# Patient Record
Sex: MALE | Race: WHITE | NOT HISPANIC OR LATINO | Employment: OTHER | URBAN - NONMETROPOLITAN AREA
[De-identification: names, ages, dates, MRNs, and addresses within clinical notes are randomized per-mention and may not be internally consistent; named-entity substitution may affect disease eponyms.]

---

## 2018-01-22 ENCOUNTER — DOCUMENTATION ONLY (OUTPATIENT)
Dept: FAMILY MEDICINE | Facility: OTHER | Age: 58
End: 2018-01-22

## 2023-02-20 ENCOUNTER — OFFICE VISIT (OUTPATIENT)
Dept: INTERNAL MEDICINE | Facility: OTHER | Age: 63
End: 2023-02-20
Attending: STUDENT IN AN ORGANIZED HEALTH CARE EDUCATION/TRAINING PROGRAM
Payer: COMMERCIAL

## 2023-02-20 VITALS
DIASTOLIC BLOOD PRESSURE: 94 MMHG | HEART RATE: 84 BPM | TEMPERATURE: 98.1 F | WEIGHT: 250.6 LBS | SYSTOLIC BLOOD PRESSURE: 136 MMHG | RESPIRATION RATE: 16 BRPM | OXYGEN SATURATION: 96 %

## 2023-02-20 DIAGNOSIS — J32.9 CHRONIC SINUSITIS, UNSPECIFIED LOCATION: Primary | ICD-10-CM

## 2023-02-20 DIAGNOSIS — L98.9 SKIN LESION: ICD-10-CM

## 2023-02-20 DIAGNOSIS — R03.0 ELEVATED BLOOD PRESSURE READING WITHOUT DIAGNOSIS OF HYPERTENSION: ICD-10-CM

## 2023-02-20 PROCEDURE — 99203 OFFICE O/P NEW LOW 30 MIN: CPT | Performed by: STUDENT IN AN ORGANIZED HEALTH CARE EDUCATION/TRAINING PROGRAM

## 2023-02-20 PROCEDURE — G0463 HOSPITAL OUTPT CLINIC VISIT: HCPCS | Performed by: STUDENT IN AN ORGANIZED HEALTH CARE EDUCATION/TRAINING PROGRAM

## 2023-02-20 RX ORDER — LEVOFLOXACIN 750 MG/1
750 TABLET, FILM COATED ORAL DAILY
Qty: 7 TABLET | Refills: 0 | Status: SHIPPED | OUTPATIENT
Start: 2023-02-20 | End: 2023-06-16

## 2023-02-20 ASSESSMENT — PAIN SCALES - GENERAL: PAINLEVEL: NO PAIN (0)

## 2023-02-20 NOTE — PROGRESS NOTES
Assessment & Plan         ICD-10-CM    1. Chronic sinusitis, unspecified location  J32.9 levofloxacin (LEVAQUIN) 750 MG tablet      2. Elevated blood pressure reading without diagnosis of hypertension  R03.0         Chronic sinusitis: Patient gets nearly annual sinus infections and has had extensive ENT work-up in the past.  Discussed how we should not do prolonged courses of antibiotics due to risk of drug reaction and resistant bacterial infections.  We will treat with a 7-day course of levofloxacin and if symptoms not improve he will return to clinic for evaluation of possible fungal causes of lung and sinus infections as he manages food plots for living.    Elevated blood pressure: Blood pressure improved on recheck to 136/94.  Does not need medication management at this point.    Skin lesion: Erythematous lesion between his 2 nares has present for approximately 1 year.  Does not appear to be a basal cell at this point but possibly could be.  Advised him to stop touching the area for 1 month and if it does not he will return to clinic for reevaluation and possible cryotherapy.  This would be an unusual spot to get a basal cell given limited sun exposure.    Follow-up in 2 to 3 months for annual exam and further discussion of sinusitis and skin lesion      No follow-ups on file.    Ata Zepeda MD  [facility]      Paola Wiseman is a 62 year old accompanied by his spouse, presenting for the following health issues:  Sinus Problem (Sinus pressure and drainage   coughing up stuff    started 3 months)      Sinus Problem     History of Present Illness       Reason for visit:  Sinus infection  Symptom onset:  More than a month  Symptoms include:  Sinus pressure lots of mucus coughingto try and remove it from lungs  Symptom intensity:  Moderate  Symptom progression:  Staying the same  Had these symptoms before:  Yes  Has tried/received treatment for these symptoms:  Yes  Previous treatment  was successful:  Yes  Prior treatment description:  Amox clav  What makes it worse:  Sleeping on my back  What makes it better:  Flushing my sinuses out with wam salt water but that only very temporary    He eats 2-3 servings of fruits and vegetables daily.He consumes 1 sweetened beverage(s) daily.He exercises with enough effort to increase his heart rate 10 to 19 minutes per day.    He is taking medications regularly.       Sinus infection   Sinus pressure and drainage  Started 3 months ago.    In 1991 had first sinus infection.   Would get sinus pressure, mucus and would lose his voice.   Rhaspy voice and would produce some thick mucus from his voicebox.   Looked in throat and no polyps at this time.   Treated with antibiotics and resolved.   Gets Sinus infection every year or so.     This time does not notice a large amount of mucus in throat.   This time has some sputum in chest. First thing in the morning when gets up is worst.   In evening has to cough up green stuff as well.   Is wheezing at night as well.   Inspiratory wheezing.     Had this one time for 4 months and treated with antibiotics.   Has been using e-visit. And not giving him 21 days of antibiotics.     Saw ENT and was told had cancer, sleep apnea and acid reflux.   Did scope in throat and was covered in white sputum.     Inclined head of bed, limited spicy foods. Limited ETOH, coffee etc.    Then got a prescription for amoxicillin and resolved on recheck. Treated with an additional 7 days of antibioitics and resolved.     No sinusitis for 2-3 years. Triggered by sinusitis and cigarette smoke.         Blood pressure and improved on recheck.       Review of Systems         Objective    BP (!) 164/110 (BP Location: Right arm, Patient Position: Sitting, Cuff Size: Adult Regular)   Pulse 84   Temp 98.1  F (36.7  C) (Temporal)   Resp 16   Wt 113.7 kg (250 lb 9.6 oz)   SpO2 96%   There is no height or weight on file to calculate BMI.  Physical Exam    General: Pleasant 62 year old year old male sitting in clinic in no acute distress  HEENT: TM normal, oropharynx with cobblestoning of oropharynx.   Skin: erythematous nonspecific lesion on tip of nose between nare.

## 2023-02-20 NOTE — NURSING NOTE
Chief Complaint   Patient presents with     Sinus Problem     Sinus pressure and drainage   coughing up stuff    started 3 months       Medication reconciliation completed.    FOOD SECURITY SCREENING QUESTIONS:    The next two questions are to help us understand your food security.  If you are feeling you need any assistance in this area, we have resources available to support you today.    Hunger Vital Signs:  Within the past 12 months we worried whether our food would run out before we got money to buy more. Never  Within the past 12 months the food we bought just didn't last and we didn't have money to get more. Never    Initial BP (!) 164/110 (BP Location: Right arm, Patient Position: Sitting, Cuff Size: Adult Regular)   Pulse 84   Temp 98.1  F (36.7  C) (Temporal)   Resp 16   Wt 113.7 kg (250 lb 9.6 oz)   SpO2 96%  There is no height or weight on file to calculate BMI.       Judi Aggarwal LPN .......  2/20/2023  12:50 PM

## 2023-02-28 ENCOUNTER — TELEPHONE (OUTPATIENT)
Dept: INTERNAL MEDICINE | Facility: OTHER | Age: 63
End: 2023-02-28
Payer: COMMERCIAL

## 2023-02-28 NOTE — TELEPHONE ENCOUNTER
Patient called to talk to Dr. Zepeda's nurse. He was given medication for a sinus infection. He is out of pills but still has some symptoms. Please call.    Raine Reilly on 2/28/2023 at 10:03 AM

## 2023-03-01 NOTE — TELEPHONE ENCOUNTER
Attempted to call patient.  No answer.  Left a message to call back.    Judi Aggarwal LPN .......  3/1/2023  3:46 PM

## 2023-04-11 ENCOUNTER — HOSPITAL ENCOUNTER (OUTPATIENT)
Dept: GENERAL RADIOLOGY | Facility: OTHER | Age: 63
Discharge: HOME | End: 2023-04-11
Attending: STUDENT IN AN ORGANIZED HEALTH CARE EDUCATION/TRAINING PROGRAM
Payer: COMMERCIAL

## 2023-04-11 ENCOUNTER — OFFICE VISIT (OUTPATIENT)
Dept: INTERNAL MEDICINE | Facility: OTHER | Age: 63
End: 2023-04-11
Attending: STUDENT IN AN ORGANIZED HEALTH CARE EDUCATION/TRAINING PROGRAM
Payer: COMMERCIAL

## 2023-04-11 VITALS
DIASTOLIC BLOOD PRESSURE: 80 MMHG | RESPIRATION RATE: 16 BRPM | HEART RATE: 112 BPM | SYSTOLIC BLOOD PRESSURE: 138 MMHG | TEMPERATURE: 98.1 F | OXYGEN SATURATION: 95 % | WEIGHT: 250.6 LBS

## 2023-04-11 DIAGNOSIS — R07.89 CHEST WALL DISCOMFORT: ICD-10-CM

## 2023-04-11 DIAGNOSIS — R05.3 CHRONIC COUGH: ICD-10-CM

## 2023-04-11 DIAGNOSIS — R73.9 ELEVATED RANDOM BLOOD GLUCOSE LEVEL: ICD-10-CM

## 2023-04-11 DIAGNOSIS — R05.3 CHRONIC COUGH: Primary | ICD-10-CM

## 2023-04-11 DIAGNOSIS — I44.7 LEFT BUNDLE BRANCH BLOCK: ICD-10-CM

## 2023-04-11 LAB
ALBUMIN SERPL BCG-MCNC: 4.5 G/DL (ref 3.5–5.2)
ALP SERPL-CCNC: 63 U/L (ref 40–129)
ALT SERPL W P-5'-P-CCNC: 40 U/L (ref 10–50)
ANION GAP SERPL CALCULATED.3IONS-SCNC: 11 MMOL/L (ref 7–15)
AST SERPL W P-5'-P-CCNC: 33 U/L (ref 10–50)
ATRIAL RATE - MUSE: 88 BPM
BILIRUB SERPL-MCNC: 0.6 MG/DL
BUN SERPL-MCNC: 12.4 MG/DL (ref 8–23)
CALCIUM SERPL-MCNC: 9.7 MG/DL (ref 8.8–10.2)
CHLORIDE SERPL-SCNC: 102 MMOL/L (ref 98–107)
CREAT SERPL-MCNC: 0.74 MG/DL (ref 0.67–1.17)
DEPRECATED HCO3 PLAS-SCNC: 25 MMOL/L (ref 22–29)
DIASTOLIC BLOOD PRESSURE - MUSE: NORMAL MMHG
ERYTHROCYTE [DISTWIDTH] IN BLOOD BY AUTOMATED COUNT: 13 % (ref 10–15)
GFR SERPL CREATININE-BSD FRML MDRD: >90 ML/MIN/1.73M2
GLUCOSE SERPL-MCNC: 158 MG/DL (ref 70–99)
HBA1C MFR BLD: 6.4 % (ref 4–6.2)
HCT VFR BLD AUTO: 43.5 % (ref 40–53)
HGB BLD-MCNC: 15.3 G/DL (ref 13.3–17.7)
INTERPRETATION ECG - MUSE: NORMAL
MCH RBC QN AUTO: 29.1 PG (ref 26.5–33)
MCHC RBC AUTO-ENTMCNC: 35.2 G/DL (ref 31.5–36.5)
MCV RBC AUTO: 83 FL (ref 78–100)
NT-PROBNP SERPL-MCNC: 82 PG/ML (ref 0–900)
P AXIS - MUSE: 45 DEGREES
PLATELET # BLD AUTO: 175 10E3/UL (ref 150–450)
POTASSIUM SERPL-SCNC: 4.1 MMOL/L (ref 3.4–5.3)
PR INTERVAL - MUSE: 186 MS
PROT SERPL-MCNC: 7.7 G/DL (ref 6.4–8.3)
QRS DURATION - MUSE: 150 MS
QT - MUSE: 418 MS
QTC - MUSE: 505 MS
R AXIS - MUSE: -45 DEGREES
RBC # BLD AUTO: 5.25 10E6/UL (ref 4.4–5.9)
SODIUM SERPL-SCNC: 138 MMOL/L (ref 136–145)
SYSTOLIC BLOOD PRESSURE - MUSE: NORMAL MMHG
T AXIS - MUSE: 107 DEGREES
TROPONIN T SERPL HS-MCNC: 8 NG/L
VENTRICULAR RATE- MUSE: 88 BPM
WBC # BLD AUTO: 4.2 10E3/UL (ref 4–11)

## 2023-04-11 PROCEDURE — 99214 OFFICE O/P EST MOD 30 MIN: CPT | Performed by: STUDENT IN AN ORGANIZED HEALTH CARE EDUCATION/TRAINING PROGRAM

## 2023-04-11 PROCEDURE — 87070 CULTURE OTHR SPECIMN AEROBIC: CPT | Mod: ZL | Performed by: STUDENT IN AN ORGANIZED HEALTH CARE EDUCATION/TRAINING PROGRAM

## 2023-04-11 PROCEDURE — 86612 BLASTOMYCES ANTIBODY: CPT | Mod: ZL | Performed by: STUDENT IN AN ORGANIZED HEALTH CARE EDUCATION/TRAINING PROGRAM

## 2023-04-11 PROCEDURE — 87385 HISTOPLASMA CAPSUL AG IA: CPT | Mod: ZL | Performed by: STUDENT IN AN ORGANIZED HEALTH CARE EDUCATION/TRAINING PROGRAM

## 2023-04-11 PROCEDURE — 80053 COMPREHEN METABOLIC PANEL: CPT | Mod: ZL | Performed by: STUDENT IN AN ORGANIZED HEALTH CARE EDUCATION/TRAINING PROGRAM

## 2023-04-11 PROCEDURE — 71046 X-RAY EXAM CHEST 2 VIEWS: CPT

## 2023-04-11 PROCEDURE — G0463 HOSPITAL OUTPT CLINIC VISIT: HCPCS

## 2023-04-11 PROCEDURE — 83880 ASSAY OF NATRIURETIC PEPTIDE: CPT | Mod: ZL | Performed by: STUDENT IN AN ORGANIZED HEALTH CARE EDUCATION/TRAINING PROGRAM

## 2023-04-11 PROCEDURE — 87205 SMEAR GRAM STAIN: CPT | Mod: ZL | Performed by: STUDENT IN AN ORGANIZED HEALTH CARE EDUCATION/TRAINING PROGRAM

## 2023-04-11 PROCEDURE — 84484 ASSAY OF TROPONIN QUANT: CPT | Mod: ZL | Performed by: STUDENT IN AN ORGANIZED HEALTH CARE EDUCATION/TRAINING PROGRAM

## 2023-04-11 PROCEDURE — 85027 COMPLETE CBC AUTOMATED: CPT | Mod: ZL | Performed by: STUDENT IN AN ORGANIZED HEALTH CARE EDUCATION/TRAINING PROGRAM

## 2023-04-11 PROCEDURE — 93005 ELECTROCARDIOGRAM TRACING: CPT | Performed by: STUDENT IN AN ORGANIZED HEALTH CARE EDUCATION/TRAINING PROGRAM

## 2023-04-11 PROCEDURE — 36415 COLL VENOUS BLD VENIPUNCTURE: CPT | Mod: ZL | Performed by: STUDENT IN AN ORGANIZED HEALTH CARE EDUCATION/TRAINING PROGRAM

## 2023-04-11 PROCEDURE — 83036 HEMOGLOBIN GLYCOSYLATED A1C: CPT | Mod: ZL | Performed by: STUDENT IN AN ORGANIZED HEALTH CARE EDUCATION/TRAINING PROGRAM

## 2023-04-11 PROCEDURE — 93010 ELECTROCARDIOGRAM REPORT: CPT | Performed by: STUDENT IN AN ORGANIZED HEALTH CARE EDUCATION/TRAINING PROGRAM

## 2023-04-11 ASSESSMENT — PAIN SCALES - GENERAL: PAINLEVEL: MILD PAIN (2)

## 2023-04-11 NOTE — LETTER
April 11, 2023      Bowen Aguirre  [address] [facility] [address]        Dear ,    We are writing to inform you of your test results.    Your initial laboratory testing is below    Resulted Orders   Comprehensive Metabolic Panel   Result Value Ref Range    Sodium 138 136 - 145 mmol/L    Potassium 4.1 3.4 - 5.3 mmol/L    Chloride 102 98 - 107 mmol/L    Carbon Dioxide (CO2) 25 22 - 29 mmol/L    Anion Gap 11 7 - 15 mmol/L    Urea Nitrogen 12.4 8.0 - 23.0 mg/dL    Creatinine 0.74 0.67 - 1.17 mg/dL    Calcium 9.7 8.8 - 10.2 mg/dL    Glucose 158 (H) 70 - 99 mg/dL    Alkaline Phosphatase 63 40 - 129 U/L    AST 33 10 - 50 U/L    ALT 40 10 - 50 U/L    Protein Total 7.7 6.4 - 8.3 g/dL    Albumin 4.5 3.5 - 5.2 g/dL    Bilirubin Total 0.6 <=1.2 mg/dL    GFR Estimate >90 >60 mL/min/1.73m2      Comment:      eGFR calculated using 2021 CKD-EPI equation.   Troponin T, High Sensitivity   Result Value Ref Range    Troponin T, High Sensitivity 8 <=22 ng/L      Comment:      Either a High Sensitivity Troponin T baseline (0 hours) value = 100 ng/L, or an increase in High Sensitivity Troponin T = 7 ng/L at 2 hours compared to 0 hours (2-0 hours), suggests myocardial injury, and urgent clinical attention is required.    If the 2-0 hours increase is <7 ng/L, a High Sensitivity Troponin T result above gender-specific reference ranges warrants further evaluation.   Recommendations for further evaluation include correlation with clinical decision-making tool (e.g., HEART), a 3rd High Sensitivity Troponin T test 2 hours after the 2nd (a 20% change from baseline would represent concern), admission for observation, close PCC/cardiology follow-up, or urgent outpatient provocative testing.   Brain Natriuretic Peptide (BNP)   Result Value Ref Range    N Terminal Pro BNP Outpatient 82 0 - 900 pg/mL      Comment:      Reference range shown and results flagged as abnormal are for the outpatient, non acute settings. Establishing a  Problem: Occupational Therapy Goal  Goal: Occupational Therapy Goal  Goals to be met by: 9/1/2017     Patient will increase functional independence with ADLs by performing:    UE Dressing with Set-up Assistance.- met 9/1  LE Dressing with Stand by Assistance. Met 9/1  Grooming while standing with Stand by Assistance. - MET 8/28  Toileting from toilet with Minimal Assistance for hygiene and clothing management. - MET 8/28   Toilet transfer to toilet with Supervision.- Met 9/1  Supine to sit with Stand by assistance. - Met         Outcome: Outcome(s) achieved Date Met: 09/01/17  All goals met     Jacquelyn Chaudhary OT  9/1/2017         baseline value for each individual patient is useful for follow-up.    Suggested inpatient cut points for confirming diagnosis of CHF in an acute setting are:  >450 pg/mL (age 18 to less than 50)  >900 pg/mL (age 50 to less than 75)  >1800 pg/mL (75 yrs and older)    An inpatient or emergency department NT-proPBNP <300 pg/mL effectively rules out acute CHF, with 99% negative predictive value.       CBC W PLT No Diff   Result Value Ref Range    WBC Count 4.2 4.0 - 11.0 10e3/uL    RBC Count 5.25 4.40 - 5.90 10e6/uL    Hemoglobin 15.3 13.3 - 17.7 g/dL    Hematocrit 43.5 40.0 - 53.0 %    MCV 83 78 - 100 fL    MCH 29.1 26.5 - 33.0 pg    MCHC 35.2 31.5 - 36.5 g/dL    RDW 13.0 10.0 - 15.0 %    Platelet Count 175 150 - 450 10e3/uL   Respiratory Aerobic Bacterial Culture   Result Value Ref Range    Gram Stain Result <10 Squamous epithelial cells/low power field (A)     Gram Stain Result <25 PMNs/low power field (A)     Gram Stain Result 2+ Gram positive cocci (A)        If you have any questions or concerns, please call the clinic at the number listed above.       Sincerely,      Ata Zepeda MD
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
## 2023-04-11 NOTE — NURSING NOTE
"Chief Complaint   Patient presents with     Nasal Congestion     \"It is in my lungs\"     follow up   not getting better         Medication reconciliation completed.    FOOD SECURITY SCREENING QUESTIONS:    The next two questions are to help us understand your food security.  If you are feeling you need any assistance in this area, we have resources available to support you today.    Hunger Vital Signs:  Within the past 12 months we worried whether our food would run out before we got money to buy more. Never  Within the past 12 months the food we bought just didn't last and we didn't have money to get more. Never    Initial /80 (BP Location: Right arm, Patient Position: Sitting, Cuff Size: Adult Regular)   Pulse 112   Temp 98.1  F (36.7  C) (Temporal)   Resp 16   Wt 113.7 kg (250 lb 9.6 oz)   SpO2 95%  There is no height or weight on file to calculate BMI.       Judi Aggarwal LPN .......  4/11/2023  10:09 AM  "

## 2023-04-13 LAB
BACTERIA SPT CULT: ABNORMAL
GRAM STAIN RESULT: ABNORMAL

## 2023-04-14 ENCOUNTER — HOSPITAL ENCOUNTER (OUTPATIENT)
Dept: CT IMAGING | Facility: OTHER | Age: 63
Discharge: HOME | End: 2023-04-14
Attending: STUDENT IN AN ORGANIZED HEALTH CARE EDUCATION/TRAINING PROGRAM | Admitting: STUDENT IN AN ORGANIZED HEALTH CARE EDUCATION/TRAINING PROGRAM
Payer: COMMERCIAL

## 2023-04-14 DIAGNOSIS — R05.3 CHRONIC COUGH: ICD-10-CM

## 2023-04-14 LAB — SCANNED LAB RESULT: NORMAL

## 2023-04-14 PROCEDURE — 71260 CT THORAX DX C+: CPT

## 2023-04-14 PROCEDURE — 250N000011 HC RX IP 250 OP 636: Performed by: STUDENT IN AN ORGANIZED HEALTH CARE EDUCATION/TRAINING PROGRAM

## 2023-04-14 RX ORDER — IOPAMIDOL 755 MG/ML
100 INJECTION, SOLUTION INTRAVASCULAR ONCE
Status: COMPLETED | OUTPATIENT
Start: 2023-04-14 | End: 2023-04-14

## 2023-04-14 RX ADMIN — IOPAMIDOL 100 ML: 755 INJECTION, SOLUTION INTRAVENOUS at 16:09

## 2023-04-15 LAB
ASPERGILLUS AB TITR SER CF: NORMAL {TITER}
B DERMAT AB SER-ACNC: 0.5 IV
COCCIDIOIDES AB TITR SER CF: NORMAL {TITER}
H CAPSUL MYC AB TITR SER CF: NORMAL {TITER}
H CAPSUL YST AB TITR SER CF: NORMAL {TITER}

## 2023-04-17 DIAGNOSIS — R91.8 PULMONARY NODULES: Primary | ICD-10-CM

## 2023-04-20 ENCOUNTER — TELEPHONE (OUTPATIENT)
Dept: INTERNAL MEDICINE | Facility: OTHER | Age: 63
End: 2023-04-20
Payer: COMMERCIAL

## 2023-04-20 ENCOUNTER — TRANSFERRED RECORDS (OUTPATIENT)
Dept: HEALTH INFORMATION MANAGEMENT | Facility: OTHER | Age: 63
End: 2023-04-20
Payer: COMMERCIAL

## 2023-04-20 NOTE — TELEPHONE ENCOUNTER
Please call the patient.  He wants to talk with PCP bout an appointment in [place].  That is all he would say.      Tiffany Najera on 4/20/2023 at 11:07 AM

## 2023-04-21 NOTE — TELEPHONE ENCOUNTER
After verifying patient's name and date of birth, patient stated [place] did call him and it was all taken care of.    Judi Aggarwal LPN....4/21/2023 10:05 AM

## 2023-06-07 ENCOUNTER — NURSE TRIAGE (OUTPATIENT)
Dept: INTERNAL MEDICINE | Facility: OTHER | Age: 63
End: 2023-06-07
Payer: COMMERCIAL

## 2023-06-07 NOTE — TELEPHONE ENCOUNTER
Reason for call: Patient wanting a work in appointment.    Is the appointment for a Hospital Follow up?  No     (If yes - Unable to find an appointment with any provider during the time frame needed. Nurse/Provider - Can this patient be worked into a schedule with PCP or team member?)    Patient is having the following symptoms: Ongoing SOB     The patient is requesting an appointment with  [facility] - telephone visit    Was an appointment offered for this call? No    If Yes, what is the date of the appointment?  Pt has an appt on 6/16    Preferred method for responding to this message: Telephone Call    Phone number patient can be reached at? Cell number on file:    Telephone Information:   Mobile [phone]       If we can't reach you directly, may we leave a detailed response at the number you provided? Yes    Can this message wait until your PCP/provider returns if unavailable today? No

## 2023-06-07 NOTE — TELEPHONE ENCOUNTER
Patient has an appointment with ÁNGEL Lee NP 6/16/23 and will keep that appointment. Unable to work him in any sooner with an IM MD as discussed. Kandi Vu RN on 6/7/2023 at 4:21 PM      Reason for Disposition    MILD longstanding difficulty breathing (e.g., speaks in phrases, SOB even at rest, pulse 100-120) and SAME as normal    Answer Assessment - Initial Assessment Questions  Back in December he was diagnosed with a bacterial lung infection along with pleural effusion. He improved with treatment, but the symptoms returned. Imaging was done and PCP discussed potentially retreating with antibiotics. Patient declined at the time and was referred to [facility] for node biopsy/bronchoscopy. This was apparently negative for concerns. They tested for fungal lung infection twice - both times results were negative. He continues to cough up yellow sputum. He has had chronic annual sinus infections, and each time they require antibiotics. The last occurrence lasted 4 months and required re-treatment.    Protocols used: BREATHING DIFFICULTY-A-OH

## 2023-06-07 NOTE — TELEPHONE ENCOUNTER
Please call and triage. [facility] is out of the office until Monday    Vi Best LPN on 6/7/2023 at 3:22 PM

## 2023-06-16 ENCOUNTER — OFFICE VISIT (OUTPATIENT)
Dept: INTERNAL MEDICINE | Facility: OTHER | Age: 63
End: 2023-06-16
Attending: NURSE PRACTITIONER
Payer: COMMERCIAL

## 2023-06-16 VITALS
TEMPERATURE: 97.6 F | HEART RATE: 78 BPM | DIASTOLIC BLOOD PRESSURE: 80 MMHG | HEIGHT: 72 IN | WEIGHT: 242 LBS | BODY MASS INDEX: 32.78 KG/M2 | RESPIRATION RATE: 16 BRPM | SYSTOLIC BLOOD PRESSURE: 132 MMHG | OXYGEN SATURATION: 97 %

## 2023-06-16 DIAGNOSIS — J32.9 CHRONIC SINUSITIS, UNSPECIFIED LOCATION: ICD-10-CM

## 2023-06-16 DIAGNOSIS — R05.3 CHRONIC COUGH: Primary | ICD-10-CM

## 2023-06-16 PROCEDURE — 99214 OFFICE O/P EST MOD 30 MIN: CPT | Performed by: NURSE PRACTITIONER

## 2023-06-16 PROCEDURE — G0463 HOSPITAL OUTPT CLINIC VISIT: HCPCS

## 2023-06-16 RX ORDER — ALBUTEROL SULFATE 90 UG/1
2 AEROSOL, METERED RESPIRATORY (INHALATION)
COMMUNITY
Start: 2023-03-01

## 2023-06-16 RX ORDER — AMOXICILLIN AND CLAVULANATE POTASSIUM 500; 125 MG/1; MG/1
TABLET, FILM COATED ORAL
COMMUNITY
Start: 2022-07-22 | End: 2023-06-16

## 2023-06-16 RX ORDER — ALBUTEROL SULFATE 90 UG/1
AEROSOL, METERED RESPIRATORY (INHALATION)
COMMUNITY
Start: 2023-03-01

## 2023-06-16 ASSESSMENT — ENCOUNTER SYMPTOMS
SHORTNESS OF BREATH: 1
COUGH: 1

## 2023-06-16 ASSESSMENT — PAIN SCALES - GENERAL: PAINLEVEL: NO PAIN (0)

## 2023-06-16 NOTE — PROGRESS NOTES
"  Assessment & Plan     Chronic cough  Discussed that we can do a trial of Augmentin twice daily for 21 days per patient's very strong request.  If he does not improve after this course of treatment I would recommend he go back to see pulmonology.  We discussed the risks that come with long-term antibiotic treatment to include C. difficile, [place] and potential and he is aware of this and is willing to proceed.  - amoxicillin-clavulanate (AUGMENTIN) 875-125 MG tablet  Dispense: 42 tablet; Refill: 0    Chronic sinusitis, unspecified location  As above.  - amoxicillin-clavulanate (AUGMENTIN) 875-125 MG tablet  Dispense: 42 tablet; Refill: 0      Independent interpretation of a test performed by another physician/other qualified health care professional (not separately reported) - notes in EPIC  Prescription drug management     BMI:   Estimated body mass index is 32.82 kg/m  as calculated from the following:    Height as of this encounter: 1.829 m (6').    Weight as of this encounter: 109.8 kg (242 lb).     Return if symptoms worsen or fail to improve.    Nelia Lee NP  [facility]    Paola Wiseman is a 63 year old, presenting for the following health issues: Shortness of Breath (7 months)    Patient presents for SOB, respiratory congestion/infection that have been ongoing for about 7 months.  Has seen numerous specialists and antibiotic therapy has been \"too short lived for any total relief of symptoms\".        6/16/2023    10:28 AM   Additional Questions   Roomed by Gabrielle HERNANDEZ LPN     Shortness of Breath  This is a chronic problem. The current episode started more than 1 month ago. The problem occurs daily. The problem has been unchanged. Associated symptoms include congestion and coughing.   History of Present Illness       Reason for visit:  Sinus issues    He eats 2-3 servings of fruits and vegetables daily.He consumes 1 sweetened beverage(s) daily.He exercises with enough " effort to increase his heart rate 60 or more minutes per day.  He exercises with enough effort to increase his heart rate 7 days per week.   He is taking medications regularly.    4/26/23:  1. 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 He certainly has many environmental exposures. I told him I would send him to talk with infectious disease if for some reason they do not think this is a true infection would then need to discuss whether or not treatment should be initiated for possible sarcoidosis but I think given the amount of secretions and inflammatory nature it would fit in my mind with histoplasmosis.  -Infectious disease consultation in the near future  Follow-Up: With ID  Demian Mccann DO  Pulmonary/Critical Care    4/24/23: Had bronchoscopy completed on 4/24/23 by Dr. Mccann - [facility]  Per ID notes: Discussed results with patient, overall his blasto and histo serologies thus far have returned negative but were still waiting on his serum histo antigen to return. Discussed treatment, pt would like to hold off at this time to avoid any toxic ADEs and since he feels he is improving. We also discussed some of his path results could be consistent with natural healing process of prior infection. Continue to follow culture results, noted one colony of penicillium, likely c/w contamination or incidental finding.  If his symptoms worsen he will reach out to us for further evaluation and treatment. Recommend fu CT in 2m or so to fu on LAD with myself or Dr. Mccann.  Guido Bautista DO  Division of Infectious Diseases     4/14/23:  Chest CT FINDINGS:  There is diffuse thoracic adenopathy. A dominant subcarinal mass measures 3.1 x 5.5 cm. A dominant left axillary node  measures 2.9 cm. The spleen is enlarged, measuring 16 cm in length. Innumerable pulmonary nodules are present, with a central peribronchovascular prominence. The largest nodule measures 13 mm in the subpleural left lower lobe. The heart is upper normal in size. No pericardial or pleural effusion is seen. No suspicious osseous lesions are seen.                                                   IMPRESSION:  Diffuse bulky thoracic adenopathy. Splenomegaly. Innumerable pulmonary nodules. Differential considerations favor sarcoidosis. Lymphoma or metastatic disease are not excluded. Recommend pulmonology consultation for possible bronchoscopy. Axillary lorne biopsy is also likely feasible.  HENOK NEWMAN MD     Review of Systems   HENT: Positive for congestion.    Respiratory: Positive for cough and shortness of breath.           Objective    /80 (BP Location: Right arm, Patient Position: Sitting, Cuff Size: Adult Regular)   Pulse 78   Temp 97.6  F (36.4  C) (Temporal)   Resp 16   Ht 1.829 m (6')   Wt 109.8 kg (242 lb)   SpO2 97%   BMI 32.82 kg/m    Body mass index is 32.82 kg/m .  Physical Exam   GENERAL: healthy, alert and no distress  EYES: Eyes grossly normal to inspection, PERRL and conjunctivae and sclerae normal  RESP: no rales , no rhonchi, expiratory wheezes throughout and inspiratory wheezes throughout  CV: regular rates and rhythm  ABDOMEN: soft, nontender and bowel sounds normal  MS: no gross musculoskeletal defects noted, no edema  NEURO: Normal strength and tone, mentation intact and speech normal  PSYCH: mentation appears normal, affect normal/bright    No results found for any visits on 06/16/23.

## 2023-06-16 NOTE — NURSING NOTE
Chief Complaint   Patient presents with     Shortness of Breath     7 months       Initial /80 (BP Location: Right arm, Patient Position: Sitting, Cuff Size: Adult Regular)   Pulse 78   Temp 97.6  F (36.4  C) (Temporal)   Resp 16   Ht 1.829 m (6')   Wt 109.8 kg (242 lb)   SpO2 97%   BMI 32.82 kg/m   Estimated body mass index is 32.82 kg/m  as calculated from the following:    Height as of this encounter: 1.829 m (6').    Weight as of this encounter: 109.8 kg (242 lb).     Medication Reconciliation: complete      FOOD SECURITY SCREENING QUESTIONS:    The next two questions are to help us understand your food security.  If you are feeling you need any assistance in this area, we have resources available to support you today.    Hunger Vital Signs:  Within the past 12 months we worried whether our food would run out before we got money to buy more. Never  Within the past 12 months the food we bought just didn't last and we didn't have money to get more. Never      Advance care plan reviewed      Gabrielle Villavicencio LPN on 6/16/2023 at 10:38 AM

## 2023-09-22 ENCOUNTER — OFFICE VISIT (OUTPATIENT)
Dept: INTERNAL MEDICINE | Facility: OTHER | Age: 63
End: 2023-09-22
Attending: STUDENT IN AN ORGANIZED HEALTH CARE EDUCATION/TRAINING PROGRAM
Payer: COMMERCIAL

## 2023-09-22 ENCOUNTER — LAB (OUTPATIENT)
Dept: INTERNAL MEDICINE | Facility: OTHER | Age: 63
End: 2023-09-22

## 2023-09-22 VITALS
WEIGHT: 240.2 LBS | BODY MASS INDEX: 34.39 KG/M2 | TEMPERATURE: 98 F | DIASTOLIC BLOOD PRESSURE: 82 MMHG | HEART RATE: 73 BPM | SYSTOLIC BLOOD PRESSURE: 138 MMHG | OXYGEN SATURATION: 96 % | HEIGHT: 70 IN | RESPIRATION RATE: 18 BRPM

## 2023-09-22 DIAGNOSIS — Z12.11 ENCOUNTER FOR SCREENING COLONOSCOPY: ICD-10-CM

## 2023-09-22 DIAGNOSIS — R07.9 CHEST PAIN, UNSPECIFIED TYPE: ICD-10-CM

## 2023-09-22 DIAGNOSIS — J32.9 CHRONIC SINUSITIS, UNSPECIFIED LOCATION: ICD-10-CM

## 2023-09-22 DIAGNOSIS — R91.8 PULMONARY NODULES: Primary | ICD-10-CM

## 2023-09-22 DIAGNOSIS — Z11.4 SCREENING FOR HUMAN IMMUNODEFICIENCY VIRUS: ICD-10-CM

## 2023-09-22 DIAGNOSIS — I44.7 LEFT BUNDLE BRANCH BLOCK: ICD-10-CM

## 2023-09-22 LAB
ALBUMIN SERPL BCG-MCNC: 4.6 G/DL (ref 3.5–5.2)
ALP SERPL-CCNC: 64 U/L (ref 40–129)
ALT SERPL W P-5'-P-CCNC: 25 U/L (ref 0–70)
ANION GAP SERPL CALCULATED.3IONS-SCNC: 14 MMOL/L (ref 7–15)
AST SERPL W P-5'-P-CCNC: 26 U/L (ref 0–45)
BASOPHILS # BLD AUTO: 0 10E3/UL (ref 0–0.2)
BASOPHILS NFR BLD AUTO: 1 %
BILIRUB SERPL-MCNC: 0.5 MG/DL
BUN SERPL-MCNC: 13.7 MG/DL (ref 8–23)
CALCIUM SERPL-MCNC: 9.7 MG/DL (ref 8.8–10.2)
CHLORIDE SERPL-SCNC: 103 MMOL/L (ref 98–107)
CREAT SERPL-MCNC: 0.75 MG/DL (ref 0.67–1.17)
DEPRECATED HCO3 PLAS-SCNC: 21 MMOL/L (ref 22–29)
EGFRCR SERPLBLD CKD-EPI 2021: >90 ML/MIN/1.73M2
EOSINOPHIL # BLD AUTO: 0.2 10E3/UL (ref 0–0.7)
EOSINOPHIL NFR BLD AUTO: 5 %
ERYTHROCYTE [DISTWIDTH] IN BLOOD BY AUTOMATED COUNT: 13 % (ref 10–15)
GLUCOSE SERPL-MCNC: 115 MG/DL (ref 70–99)
HCT VFR BLD AUTO: 43.6 % (ref 40–53)
HGB BLD-MCNC: 14.7 G/DL (ref 13.3–17.7)
IMM GRANULOCYTES # BLD: 0 10E3/UL
IMM GRANULOCYTES NFR BLD: 0 %
LYMPHOCYTES # BLD AUTO: 0.6 10E3/UL (ref 0.8–5.3)
LYMPHOCYTES NFR BLD AUTO: 17 %
MCH RBC QN AUTO: 28.5 PG (ref 26.5–33)
MCHC RBC AUTO-ENTMCNC: 33.7 G/DL (ref 31.5–36.5)
MCV RBC AUTO: 85 FL (ref 78–100)
MONOCYTES # BLD AUTO: 0.5 10E3/UL (ref 0–1.3)
MONOCYTES NFR BLD AUTO: 13 %
NEUTROPHILS # BLD AUTO: 2.3 10E3/UL (ref 1.6–8.3)
NEUTROPHILS NFR BLD AUTO: 64 %
NRBC # BLD AUTO: 0 10E3/UL
NRBC BLD AUTO-RTO: 0 /100
PLATELET # BLD AUTO: 167 10E3/UL (ref 150–450)
POTASSIUM SERPL-SCNC: 4.3 MMOL/L (ref 3.4–5.3)
PROT SERPL-MCNC: 7.7 G/DL (ref 6.4–8.3)
RBC # BLD AUTO: 5.16 10E6/UL (ref 4.4–5.9)
SODIUM SERPL-SCNC: 138 MMOL/L (ref 136–145)
TROPONIN T SERPL HS-MCNC: 7 NG/L
WBC # BLD AUTO: 3.6 10E3/UL (ref 4–11)

## 2023-09-22 PROCEDURE — 82164 ANGIOTENSIN I ENZYME TEST: CPT | Mod: ZL | Performed by: STUDENT IN AN ORGANIZED HEALTH CARE EDUCATION/TRAINING PROGRAM

## 2023-09-22 PROCEDURE — 87389 HIV-1 AG W/HIV-1&-2 AB AG IA: CPT | Mod: ZL | Performed by: STUDENT IN AN ORGANIZED HEALTH CARE EDUCATION/TRAINING PROGRAM

## 2023-09-22 PROCEDURE — 85025 COMPLETE CBC W/AUTO DIFF WBC: CPT | Mod: ZL | Performed by: STUDENT IN AN ORGANIZED HEALTH CARE EDUCATION/TRAINING PROGRAM

## 2023-09-22 PROCEDURE — 86037 ANCA TITER EACH ANTIBODY: CPT | Mod: ZL | Performed by: STUDENT IN AN ORGANIZED HEALTH CARE EDUCATION/TRAINING PROGRAM

## 2023-09-22 PROCEDURE — G0463 HOSPITAL OUTPT CLINIC VISIT: HCPCS

## 2023-09-22 PROCEDURE — 93005 ELECTROCARDIOGRAM TRACING: CPT | Performed by: STUDENT IN AN ORGANIZED HEALTH CARE EDUCATION/TRAINING PROGRAM

## 2023-09-22 PROCEDURE — 84484 ASSAY OF TROPONIN QUANT: CPT | Mod: ZL | Performed by: STUDENT IN AN ORGANIZED HEALTH CARE EDUCATION/TRAINING PROGRAM

## 2023-09-22 PROCEDURE — 99215 OFFICE O/P EST HI 40 MIN: CPT | Performed by: STUDENT IN AN ORGANIZED HEALTH CARE EDUCATION/TRAINING PROGRAM

## 2023-09-22 PROCEDURE — 36415 COLL VENOUS BLD VENIPUNCTURE: CPT | Mod: ZL | Performed by: STUDENT IN AN ORGANIZED HEALTH CARE EDUCATION/TRAINING PROGRAM

## 2023-09-22 PROCEDURE — 80053 COMPREHEN METABOLIC PANEL: CPT | Mod: ZL | Performed by: STUDENT IN AN ORGANIZED HEALTH CARE EDUCATION/TRAINING PROGRAM

## 2023-09-22 PROCEDURE — 93010 ELECTROCARDIOGRAM REPORT: CPT | Performed by: STUDENT IN AN ORGANIZED HEALTH CARE EDUCATION/TRAINING PROGRAM

## 2023-09-22 ASSESSMENT — PAIN SCALES - GENERAL: PAINLEVEL: NO PAIN (0)

## 2023-09-22 NOTE — NURSING NOTE
"Chief Complaint   Patient presents with    Follow Up     Regarding chronic cough       Medication reconciliation completed.    FOOD SECURITY SCREENING QUESTIONS:    The next two questions are to help us understand your food security.  If you are feeling you need any assistance in this area, we have resources available to support you today.    Hunger Vital Signs:  Within the past 12 months we worried whether our food would run out before we got money to buy more. Never  Within the past 12 months the food we bought just didn't last and we didn't have money to get more. Never    Initial /82 (BP Location: Right arm, Patient Position: Sitting, Cuff Size: Adult Large)   Pulse 73   Temp 98  F (36.7  C) (Temporal)   Resp 18   Ht 1.778 m (5' 10\")   Wt 109 kg (240 lb 3.2 oz)   SpO2 96%   BMI 34.47 kg/m   Estimated body mass index is 34.47 kg/m  as calculated from the following:    Height as of this encounter: 1.778 m (5' 10\").    Weight as of this encounter: 109 kg (240 lb 3.2 oz).       Judi Aggarwal LPN .......  9/22/2023  10:23 AM    "

## 2023-09-22 NOTE — LETTER
September 25, 2023      Bowen Aguirre  [address] [facility] [address]        Dear ,    We are writing to inform you of your test results.    Your laboratory results are below.  Your heart testing was normal.  Your blood counts are normal as well.  You do have a markedly elevated angiotensin-converting enzyme level which is consistent with a diagnosis of sarcoidosis.  This is very good news that is not a fungal infection and can explain the spots in your lungs.  I do want you to get the CT scan as we discussed in clinic and please make an appointment for follow-up with the pulmonologist in [place].  Please let me know if you have any questions about this.  I think we also need to follow-up your chest pain with the stress test as we discussed.  This is likely unrelated to your sarcoidosis.    Resulted Orders   Troponin T, High Sensitivity   Result Value Ref Range    Troponin T, High Sensitivity 7 <=22 ng/L      Comment:      Either a High Sensitivity Troponin T baseline (0 hours) value = 100 ng/L, or an increase in High Sensitivity Troponin T = 7 ng/L at 2 hours compared to 0 hours (2-0 hours), suggests myocardial injury, and urgent clinical attention is required.    If the 2-0 hours increase is <7 ng/L, a High Sensitivity Troponin T result above gender-specific reference ranges warrants further evaluation.   Recommendations for further evaluation include correlation with clinical decision-making tool (e.g., HEART), a 3rd High Sensitivity Troponin T test 2 hours after the 2nd (a 20% change from baseline would represent concern), admission for observation, close PCC/cardiology follow-up, or urgent outpatient provocative testing.   Angiotensin converting enzyme   Result Value Ref Range    Angiotensin Converting Enzyme 172 (H) 16 - 85 U/L      Comment:      Performed By: [facility]  [address] [place] [id number]  : Slim Walker MD, PhD  CLIA Number:  [id number]   Comprehensive Metabolic Panel   Result Value Ref Range    Sodium 138 136 - 145 mmol/L    Potassium 4.3 3.4 - 5.3 mmol/L    Chloride 103 98 - 107 mmol/L    Carbon Dioxide (CO2) 21 (L) 22 - 29 mmol/L    Anion Gap 14 7 - 15 mmol/L    Urea Nitrogen 13.7 8.0 - 23.0 mg/dL    Creatinine 0.75 0.67 - 1.17 mg/dL    Calcium 9.7 8.8 - 10.2 mg/dL    Glucose 115 (H) 70 - 99 mg/dL    Alkaline Phosphatase 64 40 - 129 U/L    AST 26 0 - 45 U/L      Comment:      Reference intervals for this test were updated on 6/12/2023 to more accurately reflect our healthy population. There may be differences in the flagging of prior results with similar values performed with this method. Interpretation of those prior results can be made in the context of the updated reference intervals.    ALT 25 0 - 70 U/L      Comment:      Reference intervals for this test were updated on 6/12/2023 to more accurately reflect our healthy population. There may be differences in the flagging of prior results with similar values performed with this method. Interpretation of those prior results can be made in the context of the updated reference intervals.      Protein Total 7.7 6.4 - 8.3 g/dL    Albumin 4.6 3.5 - 5.2 g/dL    Bilirubin Total 0.5 <=1.2 mg/dL    GFR Estimate >90 >60 mL/min/1.73m2   HIV Antigen Antibody Combo   Result Value Ref Range    HIV Antigen Antibody Combo Nonreactive Nonreactive      Comment:      HIV-1 p24 Ag & HIV-1/HIV-2 Ab Not Detected   CBC with platelets and differential   Result Value Ref Range    WBC Count 3.6 (L) 4.0 - 11.0 10e3/uL    RBC Count 5.16 4.40 - 5.90 10e6/uL    Hemoglobin 14.7 13.3 - 17.7 g/dL    Hematocrit 43.6 40.0 - 53.0 %    MCV 85 78 - 100 fL    MCH 28.5 26.5 - 33.0 pg    MCHC 33.7 31.5 - 36.5 g/dL    RDW 13.0 10.0 - 15.0 %    Platelet Count 167 150 - 450 10e3/uL    % Neutrophils 64 %    % Lymphocytes 17 %    % Monocytes 13 %    % Eosinophils 5 %    % Basophils 1 %    % Immature Granulocytes 0 %    NRBCs  per 100 WBC 0 <1 /100    Absolute Neutrophils 2.3 1.6 - 8.3 10e3/uL    Absolute Lymphocytes 0.6 (L) 0.8 - 5.3 10e3/uL    Absolute Monocytes 0.5 0.0 - 1.3 10e3/uL    Absolute Eosinophils 0.2 0.0 - 0.7 10e3/uL    Absolute Basophils 0.0 0.0 - 0.2 10e3/uL    Absolute Immature Granulocytes 0.0 <=0.4 10e3/uL    Absolute NRBCs 0.0 10e3/uL       If you have any questions or concerns, please call the clinic at the number listed above.       Sincerely,      Ata Zepeda MD

## 2023-09-22 NOTE — PROGRESS NOTES
Assessment & Plan         ICD-10-CM    1. Pulmonary nodules  R91.8 Angiotensin converting enzyme     ANCA IgG by IFA with Reflex to Titer     CBC with Platelets & Differential     Comprehensive Metabolic Panel     Angiotensin converting enzyme     ANCA IgG by IFA with Reflex to Titer     CBC with Platelets & Differential     Comprehensive Metabolic Panel     CT Chest w Contrast      2. Chest pain, unspecified type  R07.9 Troponin T, High Sensitivity     CBC with Platelets & Differential     EKG 12-lead complete w/read - Clinics     Troponin T, High Sensitivity     CBC with Platelets & Differential     NM Lexiscan stress test      3. Left bundle branch block  I44.7 NM Lexiscan stress test      4. Chronic sinusitis, unspecified location  J32.9 amoxicillin-clavulanate (AUGMENTIN) 875-125 MG tablet     ANCA IgG by IFA with Reflex to Titer     ANCA IgG by IFA with Reflex to Titer      5. Screening for human immunodeficiency virus  Z11.4 HIV Antigen Antibody Combo     HIV Antigen Antibody Combo      6. Encounter for screening colonoscopy  Z12.11 DRE([employer])        Pulmonary nodules: Multiple pulmonary nodules initially concerning for histoplasmosis.  Bronchoscopy performed and I am unable to visualize any results.  He reports that he did not test positive for histoplasma.  Fungal antigen testing was negative from me.  I did give him a 14-day course of Augmentin discussed the risks of further antibiotics.  He was given a refill so he does not have to come back in the future.  I do not think that his symptoms and pulmonary nodules are related to a bacterial sinusitis as he is concerned they are.  We will repeat a CT scan that was requested by pulmonary medicine back in July which the patient did not schedule.  If there is a accessible lesion or worsening of his nodules we will attempt biopsy versus referral to pulmonology.  There is concern that this could be sarcoid but certainly the possibility of an  undiagnosed or latent fungal infection is quite possible particularly given his soil exposure in an endemic area of blastomycosis.  We will obtain ANCA panel and ACE level to further evaluate.    Left bundle branch block, chest pain: New bundle branch block which has converted to a incomplete left bundle branch block today.  Troponin negative we will perform Lexiscan.    Declines colonoscopy was willing to undergo Cologuard.  This is ordered for him today.    61 minutes spent by me on the date of the encounter doing chart review, history and exam, documentation and further activities per the note    Follow-up with me in 3 months for annual physical exam, sooner if needed.  We will send him results from all of the above testing when he returns.    No follow-ups on file.    Ata Zepeda MD  [facility] AND [facility]      Paola Wiseman is a 63 year old, presenting for the following health issues:  Follow Up (Regarding chronic cough)      History of Present Illness       Reason for visit:  Follow up chronic cough    He eats 2-3 servings of fruits and vegetables daily.He consumes 0 sweetened beverage(s) daily.He exercises with enough effort to increase his heart rate 60 or more minutes per day.  He exercises with enough effort to increase his heart rate 7 days per week.   He is taking medications regularly.         Follow up regarding chronic cough    Swears that he needs at least 4 weeks of antibiotics to treat sinusitis he is coughing things up now.  He states they did nothing for him in [place].  I did bring up that he had a bronchoscopy.  Cultures were apparently negative for histoplasma.  He does not know that he was diagnosed with anything else.  He did not follow-up with pulmonology or infectious disease.  No fever or chills.  No chest pain.  Still working with soil via food plots.        Also having chest pain.   The other day.   Left bundle branch block on EKG from this spring.   Felt lack of energy  "  Did not feel right  Symptoms have subsequently resolved.  Denied any radiation of chest pain into the jaw shoulder etc.  No chest pressure.  He was frustrated with a client at that time and was fairly angry.  Symptoms have subsequently resolved          Review of Systems         Objective    /82 (BP Location: Right arm, Patient Position: Sitting, Cuff Size: Adult Large)   Pulse 73   Temp 98  F (36.7  C) (Temporal)   Resp 18   Ht 1.778 m (5' 10\")   Wt 109 kg (240 lb 3.2 oz)   SpO2 96%   BMI 34.47 kg/m    Body mass index is 34.47 kg/m .  Physical Exam   General: Pleasant 63-year-old man sitting in clinic compromise wife no acute distress  Pulmonary: Clear to auscultation  CV: Regular rate and rhythm    Reviewed pulmonology, infectious disease notes, most recent laboratory results.  Discussed optimal testing with radiology.                  "

## 2023-09-23 LAB — HIV 1+2 AB+HIV1 P24 AG SERPL QL IA: NONREACTIVE

## 2023-09-24 LAB — ACE SERPL-CCNC: 172 U/L

## 2023-09-25 LAB
ATRIAL RATE - MUSE: 71 BPM
DIASTOLIC BLOOD PRESSURE - MUSE: NORMAL MMHG
INTERPRETATION ECG - MUSE: NORMAL
P AXIS - MUSE: 39 DEGREES
PR INTERVAL - MUSE: 190 MS
QRS DURATION - MUSE: 104 MS
QT - MUSE: 404 MS
QTC - MUSE: 439 MS
R AXIS - MUSE: -35 DEGREES
SYSTOLIC BLOOD PRESSURE - MUSE: NORMAL MMHG
T AXIS - MUSE: 44 DEGREES
VENTRICULAR RATE- MUSE: 71 BPM

## 2023-09-26 LAB
ANCA AB PATTERN SER IF-IMP: ABNORMAL
C-ANCA TITR SER IF: ABNORMAL {TITER}

## 2023-09-29 ENCOUNTER — HOSPITAL ENCOUNTER (OUTPATIENT)
Dept: CT IMAGING | Facility: OTHER | Age: 63
Discharge: HOME | End: 2023-09-29
Attending: STUDENT IN AN ORGANIZED HEALTH CARE EDUCATION/TRAINING PROGRAM | Admitting: STUDENT IN AN ORGANIZED HEALTH CARE EDUCATION/TRAINING PROGRAM
Payer: COMMERCIAL

## 2023-09-29 DIAGNOSIS — R91.8 PULMONARY NODULES: ICD-10-CM

## 2023-09-29 PROCEDURE — 71260 CT THORAX DX C+: CPT

## 2023-09-29 PROCEDURE — 250N000011 HC RX IP 250 OP 636: Performed by: STUDENT IN AN ORGANIZED HEALTH CARE EDUCATION/TRAINING PROGRAM

## 2023-09-29 RX ORDER — IOPAMIDOL 755 MG/ML
90 INJECTION, SOLUTION INTRAVASCULAR ONCE
Status: COMPLETED | OUTPATIENT
Start: 2023-09-29 | End: 2023-09-29

## 2023-09-29 RX ADMIN — IOPAMIDOL 90 ML: 755 INJECTION, SOLUTION INTRAVENOUS at 10:31

## 2023-09-29 NOTE — LETTER
September 29, 2023      Bowen Aguirre  [address] [facility] [address]        Dear ,    We are writing to inform you of your test results.    Your CT scan shows ongoing pulmonary nodules which is consistent with presumed sarcoidosis.  I would like you to follow-up with your pulmonology team.  If you like to see a different pulmonologist please let me know.  Please contact me if you have any issues scheduling that appointment.    Resulted Orders   CT Chest w Contrast    Narrative    Exam: CT CHEST W CONTRAST      Exam reason: Pulmonary nodules    Technique: Using helical CT technique, images of the chest were  obtained. Coronal and sagittal reconstructions also performed. This CT  was performed using one or more of the following dose reduction  techniques: automated exposure control, adjustment of the mA and/or kV  according to patient size, and/or use of iterative reconstruction  technique.    Meds/Contrast: 90 ml isovue 370    Comparison: 4/14/2020     FINDINGS:    Lungs: There are numerous pulmonary nodules, a couple of which have  resolved since 4/14/2023, however the majority of the nodules have not  changed. For example, there is a 10 mm nodule in the right lower lobe  (series 3 image 67). No new consolidation.  Airways: No obstructing lesion.  Pleura: No pleural effusion or pneumothorax.  Mediastinum/Marii: There are multiple enlarged hilar and mediastinal  lymph nodes which have not changed significantly since 4/14/2023. For  example, there is a precarinal lorne conglomeration measuring up to 3  cm and a subcarinal lorne conglomeration measuring up to 5.2 x 2.9 cm,  previously 5.5 x 3.1 cm.   Cardiovascular: No aortic aneurysm. The main pulmonary artery is  slightly enlarged, possibly due to pulmonary hypertension.  Chest Wall/Axilla: There is bilateral axillary lymphadenopathy, not  significantly changed since 4/14/2023. For example, there is a left  axillary node measuring up to 2.8 cm,  previously 2.9 cm.    Musculoskeletal: No acute osseous abnormality.  Upper Abdomen: There is splenomegaly, with the spleen measuring up to  17 cm. There are multiple enlarged and prominent retroperitoneal lymph  nodes which are partially visualized, the largest of which measures  2.3 cm short axis.       Impression    IMPRESSION:     Multiple enlarged hilar and mediastinal lymph nodes, bilateral  axillary lymphadenopathy, and partially visualized retroperitoneal  lymphadenopathy, not significantly changed since 4/14/2023 where  visualized.    There are numerous pulmonary nodules couple of which have resolved  since 4/14/2023, however the majority of the nodules have not changed  significantly.    Overall, the above findings may be due to sarcoidosis or possibly  lymphoma. Tissue sampling should be considered.    Splenomegaly with the spleen measuring up to 17 cm.    ISHA GAFFNEY MD         SYSTEM ID:  [id number]       If you have any questions or concerns, please call the clinic at the number listed above.       Sincerely,      Ata Zepeda MD

## 2023-10-17 LAB — NONINV COLON CA DNA+OCC BLD SCRN STL QL: NEGATIVE
